# Patient Record
Sex: FEMALE | Race: WHITE | NOT HISPANIC OR LATINO | Employment: FULL TIME | URBAN - METROPOLITAN AREA
[De-identification: names, ages, dates, MRNs, and addresses within clinical notes are randomized per-mention and may not be internally consistent; named-entity substitution may affect disease eponyms.]

---

## 2021-10-01 ENCOUNTER — VBI (OUTPATIENT)
Dept: ADMINISTRATIVE | Facility: OTHER | Age: 56
End: 2021-10-01

## 2021-10-06 PROBLEM — Z80.3 FAMILY HISTORY OF BREAST CANCER: Status: ACTIVE | Noted: 2021-10-06

## 2021-10-08 ENCOUNTER — ANNUAL EXAM (OUTPATIENT)
Dept: OBGYN CLINIC | Facility: CLINIC | Age: 56
End: 2021-10-08
Payer: COMMERCIAL

## 2021-10-08 VITALS
WEIGHT: 144.6 LBS | BODY MASS INDEX: 29.15 KG/M2 | HEIGHT: 59 IN | DIASTOLIC BLOOD PRESSURE: 62 MMHG | SYSTOLIC BLOOD PRESSURE: 100 MMHG

## 2021-10-08 DIAGNOSIS — Z80.3 FAMILY HISTORY OF BREAST CANCER IN FEMALE: ICD-10-CM

## 2021-10-08 DIAGNOSIS — Z12.31 BREAST CANCER SCREENING BY MAMMOGRAM: ICD-10-CM

## 2021-10-08 DIAGNOSIS — Z80.3 FAMILY HISTORY OF BREAST CANCER: ICD-10-CM

## 2021-10-08 DIAGNOSIS — Z01.419 ENCOUNTER FOR GYNECOLOGICAL EXAMINATION (GENERAL) (ROUTINE) WITHOUT ABNORMAL FINDINGS: Primary | ICD-10-CM

## 2021-10-08 PROCEDURE — 99396 PREV VISIT EST AGE 40-64: CPT | Performed by: OBSTETRICS & GYNECOLOGY

## 2022-12-01 NOTE — PROGRESS NOTES
Assessment/Plan:    Encounter for gynecological examination (general) (routine) without abnormal findings  All well, no complaints  New granddaughter this year! Normal breast and pelvic exams  Last pap 7/2020 neg/HPV neg; due 2025  Mammo order given, last 11/9/2021  Colonoscopy 2018, due 2028  Dexa 2021 followed by PCP       Diagnoses and all orders for this visit:    Encounter for gynecological examination (general) (routine) without abnormal findings    Encounter for screening mammogram for malignant neoplasm of breast  -     Mammo screening bilateral w 3d & cad; Future    Family history of breast cancer - Mother    Other orders  -     Multiple Vitamin (multivitamin) tablet; Take 1 tablet by mouth daily          Subjective:      Patient ID: Arina Khan is a 59 y o  female  HPI Here for well check  The following portions of the patient's history were reviewed and updated as appropriate:   She  has a past medical history of Papanicolaou smear (2020)  She   Patient Active Problem List    Diagnosis Date Noted   • Encounter for gynecological examination (general) (routine) without abnormal findings 10/08/2021   • Family history of breast cancer - Mother 10/06/2021     She  has a past surgical history that includes Tubal ligation; Ganglion cyst excision; Colonoscopy (2018); Mammo (historical) (2020); and DXA procedure(historical) (2021)  Her family history includes Breast cancer in her family and mother; Diabetes in her mother; No Known Problems in her daughter, daughter, maternal aunt, maternal grandmother, sister, and sister  She  reports that she has never smoked  She has never used smokeless tobacco  She reports that she does not drink alcohol and does not use drugs  Current Outpatient Medications   Medication Sig Dispense Refill   • Multiple Vitamin (multivitamin) tablet Take 1 tablet by mouth daily       No current facility-administered medications for this visit       She has No Known Allergies       Review of Systems  No PMB, breast, bladder, bowel changes   No new persistent pain, bloating, early satiety or pelvic pressure      Objective:      /86 (BP Location: Left arm, Patient Position: Sitting, Cuff Size: Standard)   Ht 5' (1 524 m)   Wt 66 5 kg (146 lb 9 6 oz)   BMI 28 63 kg/m²          Physical Exam    General appearance: no distress, pleasant  Neck: thyroid without nodules or thyromegaly, no palpable adenopathy  Lymph nodes: no palpable adenopathy  Breasts: no masses, nodes or skin changes  Abdomen: soft, non tender, no palpable masses  Pelvic exam: normal atrophic external genitalia, urethral meatus normal, vagina atrophic without lesions, cervix atrophic without lesions, uterus small, non tender, no adnexal masses, non tender  Rectal exam: normal sphincter tone, no masses, RV confirms above

## 2022-12-06 ENCOUNTER — ANNUAL EXAM (OUTPATIENT)
Dept: OBGYN CLINIC | Facility: CLINIC | Age: 57
End: 2022-12-06

## 2022-12-06 VITALS
WEIGHT: 146.6 LBS | SYSTOLIC BLOOD PRESSURE: 120 MMHG | BODY MASS INDEX: 28.78 KG/M2 | HEIGHT: 60 IN | DIASTOLIC BLOOD PRESSURE: 86 MMHG

## 2022-12-06 DIAGNOSIS — Z01.419 ENCOUNTER FOR GYNECOLOGICAL EXAMINATION (GENERAL) (ROUTINE) WITHOUT ABNORMAL FINDINGS: Primary | ICD-10-CM

## 2022-12-06 DIAGNOSIS — Z12.31 ENCOUNTER FOR SCREENING MAMMOGRAM FOR MALIGNANT NEOPLASM OF BREAST: ICD-10-CM

## 2022-12-06 DIAGNOSIS — Z80.3 FAMILY HISTORY OF BREAST CANCER: ICD-10-CM

## 2022-12-06 RX ORDER — MULTIVITAMIN
1 TABLET ORAL DAILY
COMMUNITY

## 2022-12-06 NOTE — ASSESSMENT & PLAN NOTE
All well, no complaints  New granddaughter this year! Normal breast and pelvic exams    Last pap 7/2020 neg/HPV neg; due 2025  Mammo order given, last 11/9/2021  Colonoscopy 2018, due 2028  Dexa 2021 followed by PCP

## 2022-12-06 NOTE — LETTER
December 6, 2022     Carlos Anderson MD  [address] [place], [place] [address]gi [address]    Patient: Zoila Coleman   YOB: 1965   Date of Visit: 12/6/2022       Dear Dr Aretha Haegr: Thank you for referring [facility] to me for evaluation  Below are my notes for this consultation  If you have questions, please do not hesitate to call me  I look forward to following your patient along with you  Sincerely,        Henrique Brand MD        CC: No Recipients  Henrique Brand MD  12/6/2022 10:04 AM  Sign when Signing Visit  Assessment/Plan:    Encounter for gynecological examination (general) (routine) without abnormal findings  All well, no complaints  New granddaughter this year! Normal breast and pelvic exams  Last pap 7/2020 neg/HPV neg; due 2025  Mammo order given, last 11/9/2021  Colonoscopy 2018, due 2028  Dexa 2021 followed by PCP      Diagnoses and all orders for this visit:    Encounter for gynecological examination (general) (routine) without abnormal findings    Encounter for screening mammogram for malignant neoplasm of breast  -     Mammo screening bilateral w 3d & cad; Future    Family history of breast cancer - Mother    Other orders  -     Multiple Vitamin (multivitamin) tablet; Take 1 tablet by mouth daily         Subjective:     Patient ID: Zoila Coleman is a 59 y o  female  HPI Here for well check  The following portions of the patient's history were reviewed and updated as appropriate:   She  has a past medical history of Papanicolaou smear (2020)  She   Patient Active Problem List    Diagnosis Date Noted   • Encounter for gynecological examination (general) (routine) without abnormal findings 10/08/2021   • Family history of breast cancer - Mother 10/06/2021     She  has a past surgical history that includes Tubal ligation; Ganglion cyst excision; Colonoscopy (2018); Mammo (historical) (2020); and DXA procedure(historical) (2021)    Her family history includes Breast cancer in her family and mother; Diabetes in her mother; No Known Problems in her daughter, daughter, maternal aunt, maternal grandmother, sister, and sister  She  reports that she has never smoked  She has never used smokeless tobacco  She reports that she does not drink alcohol and does not use drugs  Current Outpatient Medications   Medication Sig Dispense Refill   • Multiple Vitamin (multivitamin) tablet Take 1 tablet by mouth daily       No current facility-administered medications for this visit  She has No Known Allergies       Review of Systems No PMB, breast, bladder, bowel changes   No new persistent pain, bloating, early satiety or pelvic pressure      Objective:      /86 (BP Location: Left arm, Patient Position: Sitting, Cuff Size: Standard)   Ht 5' (1 524 m)   Wt 66 5 kg (146 lb 9 6 oz)   BMI 28 63 kg/m²         Physical Exam   General appearance: no distress, pleasant  Neck: thyroid without nodules or thyromegaly, no palpable adenopathy  Lymph nodes: no palpable adenopathy  Breasts: no masses, nodes or skin changes  Abdomen: soft, non tender, no palpable masses  Pelvic exam: normal atrophic external genitalia, urethral meatus normal, vagina atrophic without lesions, cervix atrophic without lesions, uterus small, non tender, no adnexal masses, non tender  Rectal exam: normal sphincter tone, no masses, RV confirms above

## 2023-05-22 DIAGNOSIS — Z12.31 ENCOUNTER FOR SCREENING MAMMOGRAM FOR MALIGNANT NEOPLASM OF BREAST: ICD-10-CM

## 2024-02-21 PROBLEM — Z01.419 ENCOUNTER FOR GYNECOLOGICAL EXAMINATION (GENERAL) (ROUTINE) WITHOUT ABNORMAL FINDINGS: Status: RESOLVED | Noted: 2021-10-08 | Resolved: 2024-02-21

## 2025-07-28 ENCOUNTER — OFFICE VISIT (OUTPATIENT)
Age: 60
End: 2025-07-28
Payer: COMMERCIAL

## 2025-07-28 VITALS
OXYGEN SATURATION: 98 % | TEMPERATURE: 98 F | HEIGHT: 58 IN | HEART RATE: 68 BPM | DIASTOLIC BLOOD PRESSURE: 69 MMHG | SYSTOLIC BLOOD PRESSURE: 104 MMHG | BODY MASS INDEX: 30.44 KG/M2 | WEIGHT: 145 LBS

## 2025-07-28 DIAGNOSIS — R21 RASH: Primary | ICD-10-CM

## 2025-07-28 PROCEDURE — 99213 OFFICE O/P EST LOW 20 MIN: CPT | Performed by: FAMILY MEDICINE

## 2025-07-28 RX ORDER — MELOXICAM 15 MG/1
1 TABLET ORAL DAILY
COMMUNITY
Start: 2025-05-07

## 2025-07-28 RX ORDER — CLOTRIMAZOLE AND BETAMETHASONE DIPROPIONATE 10; .64 MG/G; MG/G
CREAM TOPICAL DAILY
Qty: 45 G | Refills: 1 | Status: SHIPPED | OUTPATIENT
Start: 2025-07-28